# Patient Record
Sex: FEMALE
[De-identification: names, ages, dates, MRNs, and addresses within clinical notes are randomized per-mention and may not be internally consistent; named-entity substitution may affect disease eponyms.]

---

## 2019-10-11 NOTE — CR
Chest: Two views of the chest were obtained.



Comparison: No previous chest x-ray is available.



Heart size and mediastinum are normal.  Lungs are clear.  Bony structures shows 
mild scoliosis.



Impression:  Nothing acute is appreciated on two-view chest x-ray.



Diagnostic code #2
MTDD

## 2019-10-11 NOTE — EDM.PDOC
ED HPI GENERAL MEDICAL PROBLEM





- General


Chief Complaint: General


Stated Complaint: SOB


Time Seen by Provider: 10/11/19 14:18


Source of Information: Reports: Patient


History Limitations: Reports: No Limitations





- History of Present Illness


INITIAL COMMENTS - FREE TEXT/NARRATIVE: 


HISTORY AND PHYSICAL:





History of present illness:


Patient is a 29-year-old female presents to the ED today with concern of 

generalized body ache, mild headache, cough since last night. Patient states 

she started feeling like she was getting cold symptoms yesterday and then last 

night woke up in the middle the night feeling unwell. Patient states that the 

cough sometimes makes her feel slightly short of breath but she is not 

currently feeling short of breath at this time. Patient states she has not 

taken anything for her symptoms. Patient denies any health history.





Patient denies fever, chills, chest pain. Denies neck stiff ness, change in 

vision, syncope, or near syncope. Denies nausea, vomiting, abdominal pain, 

diarrhea, constipation, or dysuria. Has not noted any blood in urine or stool. 

Patient has been eating and drinking appropriately.





Review of systems: 


As per history of present illness and below otherwise all systems reviewed and 

negative.





Past medical history: 


As per history of present illness and as reviewed below otherwise 

noncontributory.





Surgical history: 


As per history of present illness and as reviewed below otherwise 

noncontributory.





Social history: 


See social history for further information





Family history: 


As per history of present illness and as reviewed below otherwise 

noncontributory.





Physical exam:


General: Patient is alert, oriented, and in no acute distress. Patient sitting 

comfortably on exam table.


HEENT: Atraumatic, normocephalic, pupils equal and reactive bilaterally, 

negative for conjunctival pallor or scleral icterus, mucous membranes moist, 

TMs normal bilaterally, throat clear without edema, neck supple, nontender, 

trachea midline. No drooling or trismus noted. No meningeal signs. No hot 

potato voice noted. Negative pain to palpation of frontal and maxillary sinuses.


Lungs: Clear to auscultation, breath sounds equal bilaterally, chest nontender.


Heart: S1S2, regular rate and rhythm without overt murmur


Abdomen: Soft, nondistended, nontender. Negative for masses or 

hepatosplenomegaly. Negative for costovertebral tenderness.


Pelvis: Stable nontender.


Genitourinary: Deferred.


Rectal: Deferred.


Skin: Intact, warm, dry. No lesions or rashes noted.


Extremities/musculoskeletal: Atraumatic, negative for cords or calf pain. 

Neurovascular unremarkable. Negative Kernig and Brudzinski sign.


Neuro: Awake, alert, oriented. Cranial nerves II through XII unremarkable. 

Cerebellum unremarkable. Motor and sensory unremarkable throughout. Exam 

nonfocal.





Notes:


Dr. Taylor verbally involved in patient care.


Discussed the importance for follow-up with a primary care provider.


Voices understanding and is agreeable to plan of care. Denies any further 

questions or concerns at this time.





Diagnostics:


CBC, CMP, UA, chest x-ray, influenza, strep, urine hCG





Therapeutics:


Toradol





Prescription:


None





Impression: 


Flu-like symptoms


Leukocytosis





Plan:


1. You can alternate ibuprofen and Tylenol as directed for pain and discomfort.


2. Follow-up with your primary care provider as discussed. Return to the ED as 

needed and as discussed.





Definitive disposition and diagnosis as appropriate pending reevaluation and 

review of above.








- Related Data


 Allergies











Allergy/AdvReac Type Severity Reaction Status Date / Time


 


No Known Allergies Allergy   Verified 10/11/19 14:23











Home Meds: 


 Home Meds





Sertraline [Zoloft] 100 mg PO DAILY 10/12/14 [History]











Past Medical History





- Past Health History


Medical/Surgical History: Denies Medical/Surgical History


Psychiatric History: Reports: Anxiety, Depression





- Infectious Disease History


Infectious Disease History: Reports: None





Social & Family History





- Family History


Family Medical History: Noncontributory





- Caffeine Use


Caffeine Use: Reports: Coffee





- Recreational Drug Use


Recreational Drug Use: No





ED ROS GENERAL





- Review of Systems


Review Of Systems: ROS reveals no pertinent complaints other than HPI.





ED EXAM, GENERAL





- Physical Exam


Exam: See Below (See dictation)





Course





- Vital Signs


Last Recorded V/S: 


 Last Vital Signs











Temp  97.0 F   10/11/19 14:24


 


Pulse  89   10/11/19 14:24


 


Resp  17   10/11/19 14:24


 


BP  126/70   10/11/19 14:24


 


Pulse Ox  98   10/11/19 14:24














- Orders/Labs/Meds


Orders: 


 Active Orders 24 hr











 Category Date Time Status


 


 EKG Documentation Completion [RC] STAT Care  10/11/19 14:19 Active


 


 CULTURE STREP A CONFIRMATION [RM] Stat Lab  10/11/19 14:30 Results


 


 STREP SCRN A RAPID W CULT CONF [RM] Stat Lab  10/11/19 14:30 Results











Labs: 


 Laboratory Tests











  10/11/19 10/11/19 10/11/19 Range/Units





  14:22 14:22 14:30 


 


WBC    19.51 H  (4.0-11.0)  K/uL


 


RBC    4.61  (4.30-5.90)  M/uL


 


Hgb    14.3  (12.0-16.0)  g/dL


 


Hct    41.0  (36.0-46.0)  %


 


MCV    88.9  (80.0-98.0)  fL


 


MCH    31.0  (27.0-32.0)  pg


 


MCHC    34.9  (31.0-37.0)  g/dL


 


RDW Std Deviation    39.6  (28.0-62.0)  fl


 


RDW Coeff of Stefano    12  (11.0-15.0)  %


 


Plt Count    211  (150-400)  K/uL


 


MPV    10.00  (7.40-12.00)  fL


 


Neut % (Auto)    84.0 H  (48.0-80.0)  %


 


Lymph % (Auto)    8.3 L  (16.0-40.0)  %


 


Mono % (Auto)    7.5  (0.0-15.0)  %


 


Eos % (Auto)    0.1  (0.0-7.0)  %


 


Baso % (Auto)    0.1  (0.0-1.5)  %


 


Neut # (Auto)    16.4 H  (1.4-5.7)  K/uL


 


Lymph # (Auto)    1.6  (0.6-2.4)  K/uL


 


Mono # (Auto)    1.5 H  (0.0-0.8)  K/uL


 


Eos # (Auto)    0.0  (0.0-0.7)  K/uL


 


Baso # (Auto)    0.0  (0.0-0.1)  K/uL


 


Nucleated RBC %    0.0  /100WBC


 


Nucleated RBCs #    0  K/uL


 


Sodium     (136-145)  mmol/L


 


Potassium     (3.5-5.1)  mmol/L


 


Chloride     ()  mmol/L


 


Carbon Dioxide     (21.0-32.0)  mmol/L


 


BUN     (7.0-18.0)  mg/dL


 


Creatinine     (0.6-1.0)  mg/dL


 


Est Cr Clr Drug Dosing     mL/min


 


Estimated GFR (MDRD)     ml/min


 


Glucose     ()  mg/dL


 


Calcium     (8.5-10.1)  mg/dL


 


Total Bilirubin     (0.2-1.0)  mg/dL


 


AST     (15-37)  IU/L


 


ALT     (14-63)  IU/L


 


Alkaline Phosphatase     ()  U/L


 


Total Protein     (6.4-8.2)  g/dL


 


Albumin     (3.4-5.0)  g/dL


 


Globulin     (2.6-4.0)  g/dL


 


Albumin/Globulin Ratio     (0.9-1.6)  


 


Urine Color  YELLOW    


 


Urine Appearance  CLEAR    


 


Urine pH  5.5    (5.0-8.0)  


 


Ur Specific Gravity  >= 1.030    (1.001-1.035)  


 


Urine Protein  NEGATIVE    (NEGATIVE)  mg/dL


 


Urine Glucose (UA)  NEGATIVE    (NEGATIVE)  mg/dL


 


Urine Ketones  NEGATIVE    (NEGATIVE)  mg/dL


 


Urine Occult Blood  SMALL H    (NEGATIVE)  


 


Urine Nitrite  NEGATIVE    (NEGATIVE)  


 


Urine Bilirubin  NEGATIVE    (NEGATIVE)  


 


Urine Urobilinogen  0.2    (<2.0)  EU/dL


 


Ur Leukocyte Esterase  NEGATIVE    (NEGATIVE)  


 


Urine RBC  0-2    (0-2/HPF)  


 


Urine WBC  0-2    (0-5/HPF)  


 


Ur Epithelial Cells  MODERATE    (NONE-FEW)  


 


Urine Bacteria  2+ H    (NEGATIVE)  


 


Urine Mucus  MODERATE    (NONE-MOD)  


 


Urine Other      


 


Urine HCG, Qual   NEGATIVE   (NEGATIVE)  














  10/11/19 Range/Units





  14:30 


 


WBC   (4.0-11.0)  K/uL


 


RBC   (4.30-5.90)  M/uL


 


Hgb   (12.0-16.0)  g/dL


 


Hct   (36.0-46.0)  %


 


MCV   (80.0-98.0)  fL


 


MCH   (27.0-32.0)  pg


 


MCHC   (31.0-37.0)  g/dL


 


RDW Std Deviation   (28.0-62.0)  fl


 


RDW Coeff of Stefano   (11.0-15.0)  %


 


Plt Count   (150-400)  K/uL


 


MPV   (7.40-12.00)  fL


 


Neut % (Auto)   (48.0-80.0)  %


 


Lymph % (Auto)   (16.0-40.0)  %


 


Mono % (Auto)   (0.0-15.0)  %


 


Eos % (Auto)   (0.0-7.0)  %


 


Baso % (Auto)   (0.0-1.5)  %


 


Neut # (Auto)   (1.4-5.7)  K/uL


 


Lymph # (Auto)   (0.6-2.4)  K/uL


 


Mono # (Auto)   (0.0-0.8)  K/uL


 


Eos # (Auto)   (0.0-0.7)  K/uL


 


Baso # (Auto)   (0.0-0.1)  K/uL


 


Nucleated RBC %   /100WBC


 


Nucleated RBCs #   K/uL


 


Sodium  137  (136-145)  mmol/L


 


Potassium  3.8  (3.5-5.1)  mmol/L


 


Chloride  102  ()  mmol/L


 


Carbon Dioxide  24.5  (21.0-32.0)  mmol/L


 


BUN  8  (7.0-18.0)  mg/dL


 


Creatinine  0.7  (0.6-1.0)  mg/dL


 


Est Cr Clr Drug Dosing  121.69  mL/min


 


Estimated GFR (MDRD)  > 60.0  ml/min


 


Glucose  97  ()  mg/dL


 


Calcium  8.9  (8.5-10.1)  mg/dL


 


Total Bilirubin  0.7  (0.2-1.0)  mg/dL


 


AST  13 L  (15-37)  IU/L


 


ALT  22  (14-63)  IU/L


 


Alkaline Phosphatase  62  ()  U/L


 


Total Protein  7.7  (6.4-8.2)  g/dL


 


Albumin  3.8  (3.4-5.0)  g/dL


 


Globulin  3.9  (2.6-4.0)  g/dL


 


Albumin/Globulin Ratio  1.0  (0.9-1.6)  


 


Urine Color   


 


Urine Appearance   


 


Urine pH   (5.0-8.0)  


 


Ur Specific Gravity   (1.001-1.035)  


 


Urine Protein   (NEGATIVE)  mg/dL


 


Urine Glucose (UA)   (NEGATIVE)  mg/dL


 


Urine Ketones   (NEGATIVE)  mg/dL


 


Urine Occult Blood   (NEGATIVE)  


 


Urine Nitrite   (NEGATIVE)  


 


Urine Bilirubin   (NEGATIVE)  


 


Urine Urobilinogen   (<2.0)  EU/dL


 


Ur Leukocyte Esterase   (NEGATIVE)  


 


Urine RBC   (0-2/HPF)  


 


Urine WBC   (0-5/HPF)  


 


Ur Epithelial Cells   (NONE-FEW)  


 


Urine Bacteria   (NEGATIVE)  


 


Urine Mucus   (NONE-MOD)  


 


Urine Other   


 


Urine HCG, Qual   (NEGATIVE)  











Meds: 


Medications














Discontinued Medications














Generic Name Dose Route Start Last Admin





  Trade Name Eric  PRN Reason Stop Dose Admin


 


Ketorolac Tromethamine  60 mg  10/11/19 14:24  10/11/19 14:56





  Toradol  IM  10/11/19 14:25  60 mg





  ONETIME ONE   Administration





     





     





     





     














Departure





- Departure


Time of Disposition: 15:14


Disposition: Home, Self-Care 01


Clinical Impression: 


 Flu-like symptoms





Leukocytosis


Qualifiers:


 Leukocytosis type: unspecified Qualified Code(s): D72.829 - Elevated white 

blood cell count, unspecified








- Discharge Information


Referrals: 


PCP,None [Primary Care Provider] - 


Forms:  ED Department Discharge


Additional Instructions: 


The following information is given to patients seen in the emergency department 

who are being discharged to home. This information is to outline your options 

for follow-up care. We provide all patients seen in our emergency department 

with a follow-up referral.





The need for follow-up, as well as the timing and circumstances, are variable 

depending upon the specifics of your emergency department visit.





If you don't have a primary care physician on staff, we will provide you with a 

referral. We always advise you to contact your personal physician following an 

emergency department visit to inform them of the circumstance of the visit and 

for follow-up with them and/or the need for any referrals to a consulting 

specialist.





The emergency department will also refer you to a specialist when appropriate. 

This referral assures that you have the opportunity for follow-up care with a 

specialist. All of these measure are taken in an effort to provide you with 

optimal care, which includes your follow-up.





Under all circumstances we always encourage you to contact your private 

physician who remains a resource for coordinating your care. When calling for 

follow-up care, please make the office aware that this follow-up is from your 

recent emergency room visit. If for any reason you are refused follow-up, 

please contact the [facility] Emergency 

Department at [phone] and asked to speak to the emergency department 

charge nurse.





[facility]


Primary Care


[address] [place] [address]


Phone: [phone]


Fax: [phone]





[facility]


[address] [place] [address]


Phone: [phone]


Fax: [phone]





1. You can alternate ibuprofen and Tylenol as directed for pain and discomfort.


2. Follow-up with your primary care provider as discussed. Return to the ED as 

needed and as discussed.








 








- My Orders


Last 24 Hours: 


My Active Orders





10/11/19 14:19


EKG Documentation Completion [RC] STAT 





10/11/19 14:30


CULTURE STREP A CONFIRMATION [RM] Stat 


STREP SCRN A RAPID W CULT CONF [RM] Stat 














- Assessment/Plan


Last 24 Hours: 


My Active Orders





10/11/19 14:19


EKG Documentation Completion [RC] STAT 





10/11/19 14:30


CULTURE STREP A CONFIRMATION [RM] Stat 


STREP SCRN A RAPID W CULT CONF [RM] Stat

## 2021-12-06 ENCOUNTER — HOSPITAL ENCOUNTER (EMERGENCY)
Dept: HOSPITAL 56 - MW.ED | Age: 31
Discharge: HOME | End: 2021-12-06
Payer: COMMERCIAL

## 2021-12-06 DIAGNOSIS — Z79.899: ICD-10-CM

## 2021-12-06 DIAGNOSIS — F32.9: ICD-10-CM

## 2021-12-06 DIAGNOSIS — F41.9: Primary | ICD-10-CM

## 2021-12-06 NOTE — PCM.EKG
** #1 Interpretation


EKG Date: 12/06/21


Time: 17:54


EKG Interpretation Comments: 





EKG:


As interpreted by ER physician: Demetria:


Nonspecific ST-T wave abnormalities


Normal axis


No evidence of ST elevation MI


Normal sinus rhythm heart rate of 71

## 2021-12-06 NOTE — EDM.PDOCBH
ED HPI GENERAL MEDICAL PROBLEM





- General


Stated Complaint: ANXIETY


Time Seen by Provider: 12/06/21 17:51


Source of Information: Reports: Patient


History Limitations: Reports: No Limitations





- History of Present Illness


INITIAL COMMENTS - FREE TEXT/NARRATIVE: 


HISTORY AND PHYSICAL:





History of present illness:


Patient is a 31-year-old female who presents to the emergency room with 

complaints of increased anxiety and racing thoughts.  Patient states she has had

the symptoms for a few weeks now.  States she is under increased stress as she 

is going through some legal issues regarding her children with her ex-.  

She did see her primary care provider on 12/02/2021 for the symptoms and was 

taken off of her Zoloft 100 mg once daily and switch to Pristiq 25 mg once 

daily.  She was given Ativan 0.5 mg as needed for breakthrough anxiety and 

helping with sleep.  It was her understanding that she would increase her 

Pristiq dose over the next few weeks as tolerated.  She states she takes her 

medication at 0700 a.m. and feels well until about 2 PM.  She states her 

symptoms return until she takes the Ativan at night to help her sleep.  She 

states this cycle starts over again.  Patient is here with her mother, patient 

believes that she needs her Pristiq increased due to the "dose being too low".  

She denies feeling suicidal or homicidal.  She states she does take care of her 

children and needs to "get better". Patient denies any fever, chills, headache, 

change in vision, syncope or near syncope. Denies any chest pain, back pain, 

shortness of breath or cough. Denies any GI or  symptoms. No recent travel or 

sick contacts.  No alcohol or drug abuse.





Review of systems: 


As per history of present illness and below otherwise all systems reviewed and 

negative.





Past medical history: 


As per history of present illness and as reviewed below otherwise 

noncontributory.





Surgical history: 


As per history of present illness and as reviewed below otherwise 

noncontributory.





Social history: 


See social history for further information





Family history: 


As per history of present illness and as reviewed below otherwise 

noncontributory.





Physical exam:


General: Well developed and well nourished. Alert and orientated x 3. Nontoxic 

in appearance and in no acute distress. Vital signs are stable and have been 

reviewed by me. Nursing notes were reviewed. 


HEENT: Atraumatic, normocephalic, pupils equal and reactive bilaterally, 

negative for conjunctival pallor or scleral icterus, mucous membranes moist, TMs

normal bilaterally, throat clear, neck supple, nontender, trachea midline. No 

drooling or trismus noted. No meningeal signs. No hot potato voice noted. 


Lungs: Clear to auscultation bilaterally. No wheezes, rales, or rhonchi. Chest 

nontender. Normal work of breathing, no accessory muscles used.


Heart: S1S2, regular rate and rhythm without overt murmur, gallops, or rubs. No 

JVD. No peripheral edema


Abdomen: Soft, nondistended, nontender. Normoactive bowel sounds. Negative for 

masses or costovertebral tenderness.


Pelvis: Stable nontender.


Genitourinary/Rectal: Deferred.


Skin: Intact, warm, dry. No lesions or rashes noted.


Hematologic: No petechiae or purpra. Mucosa appropriate color and normal nail 

bed color and refill.


Extremities: Atraumatic, moves all extremities per self without difficulty or 

deficits, negative for cords or calf pain. Neurovascular unremarkable.


Neuro: Awake, alert, oriented. Cranial nerves II through XII unremarkable. 

Cerebellum unremarkable. Motor and sensory unremarkable throughout. Exam 

nonfocal.


Psychiatric: Mood and affect are appropriate.  Normal thought process. Answering

questions appropriately.





Please note that the patient was seen and evaluated during the 2020 SARS-CoV-2 

novel coronavirus pandemic period.  Community viral transmission is ongoing at 

time of this encounter and the emergency department is operating under pandemic 

response procedures.





Medical Decision Making:


[place] prescription drug monitoring: Jewell Espinoza NP, prescribe #30 tablets 

of lorazepam 0.5 mg on 12/02/2021 (filled).  Patient was seen by primary care 

provider and had a CBC, CMP, T3, T4, ferritin, iron, magnesium and vitamin D 

level all of which are normal.





Patient is aware of the limited mental health resources available in our 

community.  Given the patient had just had a full lab work-up done 4 days ago I 

do not feel any additional labs are warranted at this time.  We did discuss 

inpatient mental health services available at [place] or [place], patient 

declines.  She restates that she is not suicidal or homicidal and would prefer 

to stay close to home.  I did offer to call [facility] as they do 

have a CRU unit.  She is interested in this service.





1815: [facility] they will come to evaluate the patient within the

hour.  Patient was made aware.


1900:  from [facility] is here to talk with patient. 





[facility] was able to talk with the patient at length.  They have

come up with a plan to follow-up immediately tomorrow morning at 8 AM.  She was 

offered CRU services which she declines.  Patient is calm and feels much better 

since talking with [facility] .  At this time I am not can make any 

adjustments to her medications at this will be addressed tomorrow.  She 

continues to deny being suicidal or homicidal.  Mother at the bedside is 

agreeable to plan of care.  If at any time her feeling should change she is 

always welcome to return to the emergency room for further assistance.  I have 

talked with the patient about today's findings, in addition to providing 

specific details for plan of care.  Reassessment at the time of disposition 

demonstrates that the patient is in no acute distress.  The patient is stable 

for discharge, counseling was provided and we discussed in great detail signs 

and symptoms that would prompt them to return to the Emergency Department. 

Medication, follow up and supportive care measures were reviewed and discussed. 

Voices understanding and is agreeable to plan of care. Denies any further 

questions or concerns at this time.





Diagnostics:


None





Therapeutics:


[facility] Crisis Evaluation





Prescription:


None





Impression: 


Anxiety


Depression





Plan:


1.  You were evaluated today on an emergent basis. You have set up services with

[facility]. Please follow up tomorrow as you have arranged.


2.  Take your home medications as directed


3.  If your symptoms should worsen, new symptoms develop or any of the signs and

symptoms we discussed should arise please return to the emergency room or call 

911 (if needed).





Definitive disposition and diagnosis as appropriate pending reevaluation and 

review of above.








- Related Data


                                    Allergies











Allergy/AdvReac Type Severity Reaction Status Date / Time


 


No Known Allergies Allergy   Verified 12/06/21 17:59











Home Meds: 


                                    Home Meds





Desvenlafaxine Succinate [Pristiq] 25 mg PO DAILY 12/06/21 [History]











Past Medical History





- Past Health History


Medical/Surgical History: Denies Medical/Surgical History


Psychiatric History: Reports: Anxiety, Depression





- Infectious Disease History


Infectious Disease History: Reports: None





Social & Family History





- Family History


Family Medical History: No Pertinent Family History





- Caffeine Use


Caffeine Use: Reports: Coffee





ED ROS GENERAL





- Review of Systems


Review Of Systems: Comprehensive ROS is negative, except as noted in HPI.





ED EXAM, BEHAVIORAL HEALTH





- Physical Exam


Exam: See Below (See dictation)





COURSE, BEHAVIORAL HEALTH COMP





- Course


Vital Signs: 


                                Last Vital Signs











Temp  96.9 F   12/06/21 18:02


 


Pulse  85   12/06/21 18:02


 


Resp  16   12/06/21 18:02


 


BP  131/89   12/06/21 18:02


 


Pulse Ox  96   12/06/21 18:02














Departure





- Departure


Time of Disposition: 19:25


Disposition: Home, Self-Care 01


Clinical Impression: 


 Depressive disorder, Anxiety








- Discharge Information


Instructions:  Supporting Someone With Anxiety


Referrals: 


Jewell Espinoza NP [Primary Care Provider] - 


Additional Instructions: 


The following information is given to patients seen in the emergency department 

who are being discharged to home. This information is to outline your options 

for follow-up care. We provide all patients seen in our emergency department 

with a follow-up referral.





The need for follow-up, as well as the timing and circumstances, are variable 

depending upon the specifics of your emergency department visit.





If you don't have a primary care physician on staff, we will provide you with a 

referral. We always advise you to contact your personal physician following an 

emergency department visit to inform them of the circumstance of the visit and 

for follow-up with them and/or the need for any referrals to a consulting 

specialist.





The emergency department will also refer you to a specialist when appropriate. 

This referral assures that you have the opportunity for follow-up care with a 

specialist. All of these measure are taken in an effort to provide you with 

optimal care, which includes your follow-up.





Under all circumstances we always encourage you to contact your private 

physician who remains a resource for coordinating your care. When calling for 

follow-up care, please make the office aware that this follow-up is from your 

recent emergency room visit. If for any reason you are refused follow-up, please

contact the [facility] Emergency Department

at [phone] and asked to speak to the emergency department charge nurse.





[facility]


[address] [facility] [address]


[phone]


Crisis Line: [phone]





Thank you for choosing the [facility] emergency department in 

[place] for your medical needs today.  It was a pleasure caring for you. Today

you were seen in the emergency department for anxiety and depression.





1.  You were evaluated today on an emergent basis. You have set up services with

[facility]. Please follow up tomorrow as you have arranged.


2.  Take your home medications as directed


3.  If your symptoms should worsen, new symptoms develop or any of the signs and

symptoms we discussed should arise please return to the emergency room or call 

911 (if needed).





Sepsis Event Note (ED)





- Focused Exam


Vital Signs: 


                                   Vital Signs











  Temp Pulse Resp BP Pulse Ox


 


 12/06/21 18:02  96.9 F  85  16  131/89  96